# Patient Record
Sex: MALE | Race: WHITE | NOT HISPANIC OR LATINO | Employment: OTHER | URBAN - METROPOLITAN AREA
[De-identification: names, ages, dates, MRNs, and addresses within clinical notes are randomized per-mention and may not be internally consistent; named-entity substitution may affect disease eponyms.]

---

## 2021-04-04 ENCOUNTER — HOSPITAL ENCOUNTER (EMERGENCY)
Facility: HOSPITAL | Age: 71
Discharge: HOME | End: 2021-04-04
Attending: EMERGENCY MEDICINE | Admitting: EMERGENCY MEDICINE

## 2021-04-04 VITALS
HEART RATE: 88 BPM | OXYGEN SATURATION: 97 % | HEIGHT: 68 IN | RESPIRATION RATE: 18 BRPM | SYSTOLIC BLOOD PRESSURE: 155 MMHG | TEMPERATURE: 98.3 F | DIASTOLIC BLOOD PRESSURE: 86 MMHG | BODY MASS INDEX: 26.06 KG/M2 | WEIGHT: 171.96 LBS

## 2021-04-04 DIAGNOSIS — Z76.0 MEDICATION REFILL: Primary | ICD-10-CM

## 2021-04-04 PROCEDURE — 99282 EMERGENCY DEPT VISIT SF MDM: CPT

## 2021-04-04 RX ORDER — ATORVASTATIN CALCIUM 40 MG/1
40 TABLET, FILM COATED ORAL DAILY
Qty: 30 TABLET | Refills: 0 | Status: SHIPPED | OUTPATIENT
Start: 2021-04-04

## 2021-04-04 RX ORDER — LAMOTRIGINE 100 MG/1
100 TABLET ORAL DAILY
Qty: 30 TABLET | Refills: 0 | Status: SHIPPED | OUTPATIENT
Start: 2021-04-04

## 2021-04-04 NOTE — ED PROVIDER NOTES
Subjective   71 y/o male the presents the emergency department I need medication refills.  He states that he is supposed to leave here for the end of March but some circumstances changed he will be unable to fly out until on May 1.  He has a list of medications with him.  He has no specific complaints.      History provided by:  Patient   used: No    Illness  Location:  Medication refill no complaints  Severity:  Mild  Associated symptoms: no abdominal pain, no chest pain, no cough, no diarrhea, no fever, no loss of consciousness, no myalgias, no rhinorrhea, no shortness of breath, no sore throat, no vomiting and no wheezing        Review of Systems   Constitutional: Negative for fever.   HENT: Negative for rhinorrhea and sore throat.    Respiratory: Negative for cough, shortness of breath and wheezing.    Cardiovascular: Negative for chest pain.   Gastrointestinal: Negative for abdominal pain, diarrhea and vomiting.   Musculoskeletal: Negative for myalgias.   Neurological: Negative for loss of consciousness.   All other systems reviewed and are negative.      No past medical history on file.    No Known Allergies    No past surgical history on file.    No family history on file.    Social History     Socioeconomic History   • Marital status:      Spouse name: Not on file   • Number of children: Not on file   • Years of education: Not on file   • Highest education level: Not on file           Objective   Physical Exam  Vitals and nursing note reviewed.   Constitutional:       Appearance: He is well-developed.   HENT:      Head: Normocephalic and atraumatic.      Right Ear: External ear normal.      Left Ear: External ear normal.      Nose: Nose normal.   Eyes:      General: No scleral icterus.     Conjunctiva/sclera: Conjunctivae normal.      Pupils: Pupils are equal, round, and reactive to light.   Neck:      Thyroid: No thyromegaly.   Musculoskeletal:         General: Normal range of  "motion.   Skin:     General: Skin is warm and dry.   Neurological:      Mental Status: He is alert and oriented to person, place, and time.      Cranial Nerves: No cranial nerve deficit.      Coordination: Coordination normal.      Deep Tendon Reflexes: Reflexes are normal and symmetric. Reflexes normal.   Psychiatric:         Behavior: Behavior normal.         Thought Content: Thought content normal.         Judgment: Judgment normal.         Procedures           ED Course  ED Course as of Apr 04 2216   Sun Apr 04, 2021   1407 Went through his list of medications.  Anayeli refill the ones that I can either some that are not available in the [place] and that are controlled that we are unable to fill.    [SAURABH]      ED Course User Index  [SAURABH] Avel Gore PA                                 No results found for this or any previous visit (from the past 24 hour(s)).  Note: In addition to lab results from this visit, the labs listed above may include labs taken at another facility or during a different encounter within the last 24 hours. Please correlate lab times with ED admission and discharge times for further clarification of the services performed during this visit.    No orders to display     Vitals:    04/04/21 1236   BP: 155/86   BP Location: Left arm   Patient Position: Sitting   Pulse: 88   Resp: 18   Temp: 98.3 °F (36.8 °C)   TempSrc: Oral   SpO2: 97%   Weight: 78 kg (171 lb 15.3 oz)   Height: 172 cm (67.72\")     Medications - No data to display  ECG/EMG Results (last 24 hours)     ** No results found for the last 24 hours. **        No orders to display                 MDM  Number of Diagnoses or Management Options  Medication refill: new and requires workup     Amount and/or Complexity of Data Reviewed  Discuss the patient with other providers: yes    Patient Progress  Patient progress: stable      Final diagnoses:   Medication refill       ED Disposition  ED Disposition     ED Disposition Condition Comment "    Discharge Stable           No follow-up provider specified.       Medication List      New Prescriptions    atorvastatin 40 MG tablet  Commonly known as: LIPITOR  Take 1 tablet by mouth Daily.     lamoTRIgine 100 MG tablet  Commonly known as: LaMICtal  Take 1 tablet by mouth Daily.     metFORMIN 500 MG tablet  Commonly known as: GLUCOPHAGE  Take 2 tablets by mouth 2 (Two) Times a Day With Meals.     SAXagliptin 5 MG tablet  Commonly known as: Onglyza  Take 1 tablet by mouth Daily.           Where to Get Your Medications      You can get these medications from any pharmacy    Bring a paper prescription for each of these medications  · atorvastatin 40 MG tablet  · lamoTRIgine 100 MG tablet  · metFORMIN 500 MG tablet  · SAXagliptin 5 MG tablet          Avel Gore PA  04/04/21 9449